# Patient Record
Sex: FEMALE | Race: WHITE
[De-identification: names, ages, dates, MRNs, and addresses within clinical notes are randomized per-mention and may not be internally consistent; named-entity substitution may affect disease eponyms.]

---

## 2023-01-03 ENCOUNTER — HOSPITAL ENCOUNTER (OUTPATIENT)
Dept: HOSPITAL 75 - ENDO | Age: 58
Discharge: HOME | End: 2023-01-03
Attending: SURGERY
Payer: COMMERCIAL

## 2023-01-03 VITALS — SYSTOLIC BLOOD PRESSURE: 121 MMHG | DIASTOLIC BLOOD PRESSURE: 78 MMHG

## 2023-01-03 VITALS — DIASTOLIC BLOOD PRESSURE: 57 MMHG | SYSTOLIC BLOOD PRESSURE: 119 MMHG

## 2023-01-03 VITALS — WEIGHT: 143.08 LBS | BODY MASS INDEX: 20.48 KG/M2 | HEIGHT: 70 IN

## 2023-01-03 VITALS — SYSTOLIC BLOOD PRESSURE: 95 MMHG | DIASTOLIC BLOOD PRESSURE: 50 MMHG

## 2023-01-03 VITALS — SYSTOLIC BLOOD PRESSURE: 119 MMHG | DIASTOLIC BLOOD PRESSURE: 57 MMHG

## 2023-01-03 DIAGNOSIS — Z87.891: ICD-10-CM

## 2023-01-03 DIAGNOSIS — Z12.11: Primary | ICD-10-CM

## 2023-01-03 DIAGNOSIS — Z86.010: ICD-10-CM

## 2023-01-03 DIAGNOSIS — K57.30: ICD-10-CM

## 2023-01-03 NOTE — DISCHARGE INST-SIMPLE/STANDARD
Discharge Inst-Standard


Patient Instructions/Follow Up


Plan of Care/Instructions/FU:  


5 years Tj any issues before that be seen at that time


Activity as Tolerated:  Yes


Discharge Diet:  Regular Diet (high fiber)











DENIS TOMLINSON DO               Henrique 3, 2023 10:03

## 2023-01-03 NOTE — PROGRESS NOTE-PRE OPERATIVE
Pre-Operative Progress Note


Date of Available H&P:  Dec 12, 2022


Date H&P Reviewed:  Henrique 3, 2023


Time H&P Reviewed:  08:53


History & Physical:  H&P Reviewed, Patient Examed, No changes noted


Pre-Operative Diagnosis:  hx polyps











DENIS TOMLINSON DO               Henrique 3, 2023 08:53

## 2023-01-03 NOTE — OPERATIVE REPORT
DATE OF SERVICE: 01/03/2023



PREOPERATIVE DIAGNOSIS:

History of polyps.



POSTOPERATIVE DIAGNOSIS:

Diverticulosis, history of polyps.



PROCEDURE:

Colonoscopy.



SURGEON:

Denis Spencer DO



ANESTHESIA:

Per CRNA.



ESTIMATED BLOOD LOSS:

None.



COMPLICATIONS:

None.



INDICATIONS:

The patient is a 57-year-old female, needing screening colonoscopy.  She has a 

history of polyps.  She understands risks and benefits of the procedure and 

wishes to proceed.  Consent was signed in chart.



DESCRIPTION OF PROCEDURE:

The patient was taken to the endoscopy suite, placed in left lateral recumbent 

position.  A timeout was performed.  Digital rectal exam was performed.  No 

palpable polyps, masses or ulcerations.  Scope was inserted in the rectum and 

advanced all the way to the cecum with minimal difficulty.  Prep was adequate.  

Scope was retracted back.  No polyps, masses or ulcerations in the cecum, 

ascending, transverse, descending and sigmoid colon.  Minimal amount of 

diverticulosis throughout the sigmoid colon.  Once in the rectum, scope was 

retroflexed, noting no other pathology.  Scope was returned to its normal 

position, slowly withdrawn until completely removed.  The patient tolerated the 

procedure well with no complications, taken to recovery room in stable 

condition.



RECOMMENDATIONS:

The patient will need repeat colonoscopy in 5 years. Any issues before that be 

seen at that time.













Job ID: [id number]

DocumentID: [id number]

Dictated Date: 01/03/2023 10:08:50

Transcription Date: 01/03/2023 17:49:00

Dictated By: DENIS SPENCER DO